# Patient Record
Sex: FEMALE | Race: WHITE | Employment: UNEMPLOYED | URBAN - METROPOLITAN AREA
[De-identification: names, ages, dates, MRNs, and addresses within clinical notes are randomized per-mention and may not be internally consistent; named-entity substitution may affect disease eponyms.]

---

## 2023-03-06 ENCOUNTER — APPOINTMENT (OUTPATIENT)
Dept: GENERAL RADIOLOGY | Age: 43
End: 2023-03-06
Payer: COMMERCIAL

## 2023-03-06 ENCOUNTER — HOSPITAL ENCOUNTER (EMERGENCY)
Age: 43
Discharge: HOME | End: 2023-03-06
Attending: EMERGENCY MEDICINE
Payer: COMMERCIAL

## 2023-03-06 VITALS
SYSTOLIC BLOOD PRESSURE: 129 MMHG | OXYGEN SATURATION: 95 % | HEART RATE: 76 BPM | TEMPERATURE: 98 F | BODY MASS INDEX: 33.29 KG/M2 | WEIGHT: 195 LBS | RESPIRATION RATE: 15 BRPM | DIASTOLIC BLOOD PRESSURE: 84 MMHG | HEIGHT: 64 IN

## 2023-03-06 DIAGNOSIS — R07.81 RIB PAIN ON RIGHT SIDE: Primary | ICD-10-CM

## 2023-03-06 PROCEDURE — 6370000000 HC RX 637 (ALT 250 FOR IP): Performed by: EMERGENCY MEDICINE

## 2023-03-06 PROCEDURE — 71046 X-RAY EXAM CHEST 2 VIEWS: CPT

## 2023-03-06 PROCEDURE — 99283 EMERGENCY DEPT VISIT LOW MDM: CPT

## 2023-03-06 RX ORDER — BENZONATATE 200 MG/1
200 CAPSULE ORAL 3 TIMES DAILY PRN
Qty: 21 CAPSULE | Refills: 0 | Status: SHIPPED | OUTPATIENT
Start: 2023-03-06 | End: 2023-03-13

## 2023-03-06 RX ORDER — HYDROCODONE BITARTRATE AND HOMATROPINE METHYLBROMIDE ORAL SOLUTION 5; 1.5 MG/5ML; MG/5ML
5 LIQUID ORAL EVERY 6 HOURS PRN
Qty: 120 ML | Refills: 0 | Status: SHIPPED | OUTPATIENT
Start: 2023-03-06 | End: 2023-03-13

## 2023-03-06 RX ORDER — LIDOCAINE 4 G/G
1 PATCH TOPICAL ONCE
Status: DISCONTINUED | OUTPATIENT
Start: 2023-03-06 | End: 2023-03-06 | Stop reason: HOSPADM

## 2023-03-06 RX ORDER — HYDROCODONE BITARTRATE AND ACETAMINOPHEN 5; 325 MG/1; MG/1
1 TABLET ORAL
Status: COMPLETED | OUTPATIENT
Start: 2023-03-06 | End: 2023-03-06

## 2023-03-06 RX ORDER — LIDOCAINE 50 MG/G
1 PATCH TOPICAL DAILY
Qty: 30 PATCH | Refills: 0 | Status: SHIPPED | OUTPATIENT
Start: 2023-03-06 | End: 2023-04-05

## 2023-03-06 RX ORDER — IBUPROFEN 600 MG/1
600 TABLET ORAL EVERY 6 HOURS PRN
Qty: 20 TABLET | Refills: 0 | Status: SHIPPED | OUTPATIENT
Start: 2023-03-06 | End: 2023-04-05

## 2023-03-06 RX ADMIN — HYDROCODONE BITARTRATE AND ACETAMINOPHEN 1 TABLET: 5; 325 TABLET ORAL at 06:46

## 2023-03-06 ASSESSMENT — PAIN SCALES - GENERAL: PAINLEVEL_OUTOF10: 8

## 2023-03-06 NOTE — Clinical Note
Yuli White was seen and treated in our emergency department on 3/6/2023. She may return to work on 03/08/2023. If you have any questions or concerns, please don't hesitate to call.       Coni Beauchamp MD

## 2023-03-06 NOTE — ED PROVIDER NOTES
Triage Chief Complaint:    Rib Pain (R sided. Reports feeling pain after coughing. Reports pain continues and is painful when pressure applied and hurts to breathe or cough/)    SEMAJ Mcconnell is a 37 y.o. female that presents for evaluation of 4 days of cough. She states is mostly nonproductive. She started having some right-sided rib pain which she attributes to the cough. She feels as though the cough has made it worse. Palpation over the right lower ribs also makes it worse. Denies any abdominal pain with this. No nausea or vomiting. No flank pain. Has not noticed any urinary or vaginal symptoms. No change to bowel movements. She has not tried nothing to help alleviate her symptoms. In no other exacerbating symptoms besides palpation and cough. Not on any birth control. No hemoptysis. No long periods of immobilization. No lower extremity swelling. No history of DVT or PE. No history of cancer or hypercoagulable state. History from : Patient    Limitations to history : None    ROS:  10 systems reviewed and negative except as above. Past Medical History:   Diagnosis Date    Hepatitis C      Past Surgical History:   Procedure Laterality Date    ANKLE SURGERY Left     RHINOPLASTY      TUBAL LIGATION       History reviewed. No pertinent family history.   Social History     Socioeconomic History    Marital status: Not on file     Spouse name: Not on file    Number of children: Not on file    Years of education: Not on file    Highest education level: Not on file   Occupational History    Not on file   Tobacco Use    Smoking status: Every Day     Packs/day: 0.25     Types: Cigarettes    Smokeless tobacco: Not on file   Substance and Sexual Activity    Alcohol use: Yes     Comment: 2x week    Drug use: Not Currently    Sexual activity: Not on file   Other Topics Concern    Not on file   Social History Narrative    Not on file     Social Determinants of Health     Financial Resource Strain: Not on file   Food Insecurity: Not on file   Transportation Needs: Not on file   Physical Activity: Not on file   Stress: Not on file   Social Connections: Not on file   Intimate Partner Violence: Not on file   Housing Stability: Not on file     Current Facility-Administered Medications   Medication Dose Route Frequency Provider Last Rate Last Admin    lidocaine 4 % external patch 1 patch  1 patch TransDERmal Once Moses Freed MD   1 patch at 03/06/23 0646     Current Outpatient Medications   Medication Sig Dispense Refill    ibuprofen (IBU) 600 MG tablet Take 1 tablet by mouth every 6 hours as needed for Pain 20 tablet 0    lidocaine (LIDODERM) 5 % Place 1 patch onto the skin daily 12 hours on, 12 hours off. Ok to substitute for 4% if not covered by insurance 30 patch 0    HYDROcodone homatropine (HYCODAN) 5-1.5 MG/5ML solution Take 5 mLs by mouth every 6 hours as needed (cough) for up to 7 days. Max Daily Amount: 20 mLs 120 mL 0    benzonatate (TESSALON) 200 MG capsule Take 1 capsule by mouth 3 times daily as needed for Cough 21 capsule 0     No Known Allergies    Nursing Notes Reviewed    Physical Exam:     ED Triage Vitals   Enc Vitals Group      BP 03/06/23 0602 (!) 146/102      Heart Rate 03/06/23 0607 80      Resp 03/06/23 0607 16      Temp 03/06/23 0612 98 °F (36.7 °C)      Temp Source 03/06/23 0612 Oral      SpO2 03/06/23 0602 96 %      Weight 03/06/23 0607 195 lb (88.5 kg)      Height 03/06/23 0607 5' 4\" (1.626 m)      Head Circumference --       Peak Flow --       Pain Score --       Pain Loc --       Pain Edu? --       Excl. in [address]? --        My pulse ox interpretation is - normal    General appearance:  No acute distress. Skin:  Warm. Dry. Eye:  Extraocular movements intact. Ears, nose, mouth and throat:  Oral mucosa moist, boggy turbinates bilaterally. Neck:  Trachea midline. No significant cervical adenopathy noted. Extremity:  No swelling. Normal ROM     Heart:  Regular rate and rhythm. Perfusion:  intact  Respiratory:  Lungs clear to auscultation bilaterally. Respirations nonlabored. Chest wall: Tenderness palpation over the right lower ribs which reproduces her complaint but no crepitus step-offs or deformities. No overlying skin changes. Abdominal:  Normal bowel sounds. Soft. Nontender. Non distended. Back:  No CVA tenderness to palpation     Neurological:  Alert and oriented times 3. Motor and sensory grossly intact, coordination intact          Psychiatric:  Appropriate      I have reviewed and interpreted all of the currently available lab results from this visit (if applicable):  No results found for this visit on 03/06/23. Radiographs (if obtained):  [] The following radiograph was interpreted by myself in the absence of a radiologist:   [x] Radiologist's Report Reviewed:  XR CHEST (2 VW)   Final Result   No acute findings in the chest.             Procedures:  None      Chart review shows recent radiographs:  No results found. MDM:     Discussion with Other Professionals : None    Social Determinants: None    Records Reviewed : None    Chronic conditions affecting care:  Smoking history    Imaging interpreted and reviewed by myself: CXR showed no evidence of pneumonia, new effusion, or pneumothorax. Patient was given the following medications:  Medications   lidocaine 4 % external patch 1 patch (1 patch TransDERmal Patch Applied 3/6/23 0646)   HYDROcodone-acetaminophen (NORCO) 5-325 MG per tablet 1 tablet (1 tablet Oral Given 3/6/23 0646)       Disposition Discussion: This patient appears to have more musculoskeletal etiology with her symptoms. Patient has reassuring vitals here. Treated symptomatically as chest x-ray does not show any acute irregularities. The patient had improvement after medications ministered here. No GI symptoms to suggest need for hepatobiliary etiology or work-up. I have low clinical suspicion for cholecystitis or cholelithiasis.   Patient does not have any abdominal tenderness to suggest bowel obstruction. Low risk by Wells criteria and PERC negative. Low clinical suspicion for PE. She knows she needs to follow-up closely with primary care doctor within the next 2-3 days. .    Disposition: Discharged     I am the primary physician of record    Clinical Impression:  1. Rib pain on right side      Disposition referral (if applicable):  Jihan Bhakta MD  [facility] [phone]    Schedule an appointment as soon as possible for a visit       [facility] Emergency Department  [facility] [phone]    If symptoms worsen  Disposition medications (if applicable):  New Prescriptions    BENZONATATE (TESSALON) 200 MG CAPSULE    Take 1 capsule by mouth 3 times daily as needed for Cough    HYDROCODONE HOMATROPINE (HYCODAN) 5-1.5 MG/5ML SOLUTION    Take 5 mLs by mouth every 6 hours as needed (cough) for up to 7 days. Max Daily Amount: 20 mLs    IBUPROFEN (IBU) 600 MG TABLET    Take 1 tablet by mouth every 6 hours as needed for Pain    LIDOCAINE (LIDODERM) 5 %    Place 1 patch onto the skin daily 12 hours on, 12 hours off. Ok to substitute for 4% if not covered by insurance       Comment: Please note this report has been produced using speech recognition software and may contain errors related to that system including errors in grammar, punctuation, and spelling, as well as words and phrases that may be inappropriate. If there are any questions or concerns please feel free to contact the dictating provider for clarification.        Abimbola Morillo MD  03/06/23 8666

## 2023-03-06 NOTE — ED NOTES
Discharge instructions reviewed with pt. Pt given paper prescriptions and instructed to take to whatever pharmacy of her choice. All questions answered at this time. Pt verbalized understanding.       Valerie Meneses, [facility]  03/06/23 3665

## 2023-09-25 ENCOUNTER — OFFICE (OUTPATIENT)
Dept: URBAN - METROPOLITAN AREA CLINIC 16 | Facility: CLINIC | Age: 43
End: 2023-09-25
Payer: COMMERCIAL

## 2023-09-25 VITALS
HEIGHT: 64 IN | DIASTOLIC BLOOD PRESSURE: 84 MMHG | OXYGEN SATURATION: 98 % | SYSTOLIC BLOOD PRESSURE: 128 MMHG | HEART RATE: 123 BPM | WEIGHT: 203 LBS

## 2023-09-25 DIAGNOSIS — K21.9 GASTRO-ESOPHAGEAL REFLUX DISEASE WITHOUT ESOPHAGITIS: ICD-10-CM

## 2023-09-25 DIAGNOSIS — F10.10 ALCOHOL ABUSE, UNCOMPLICATED: ICD-10-CM

## 2023-09-25 DIAGNOSIS — K44.9 DIAPHRAGMATIC HERNIA WITHOUT OBSTRUCTION OR GANGRENE: ICD-10-CM

## 2023-09-25 PROCEDURE — 99204 OFFICE O/P NEW MOD 45 MIN: CPT | Performed by: INTERNAL MEDICINE

## 2023-09-25 NOTE — SERVICEHPINOTES
Myah Garcia   is a   43   year old   female   patient who is seen   at the request of   Zion Hairston   for a consultation/initial visit.She is now 43 years old and has a history of reflux and hiatal hernia and symptoms that have been profound. She had been incarcerated for 5 months after DUI and her symptoms worsened over time she was even given medications with famotidine without complete improvement. She is due for screening nonetheless and does have a history of hepatitis C we will follow-up on labs to check for hepatitis and we will proceed with both colonoscopy and EGD for investigation

## 2023-09-25 NOTE — SERVICENOTES
She will have follow-up testing for hepatitis C and EGD and colonoscopy for evaluation of reflux and hiatal hernia with incomplete relief with medication with Pepcid.  She will have screening as well at the same time.  She has cut down on alcohol she did not drink for many years socially otherwise

## 2023-09-27 LAB
HCV VIRAL RNA,PCR W/REFLEX TO GENOTYPE: HCV RNA, PCR QN LOG: <1.18 LOG IU/ML
HCV VIRAL RNA,PCR W/REFLEX TO GENOTYPE: HCV RNA, PCR, QUAL: NOT DETECTED
HCV VIRAL RNA,PCR W/REFLEX TO GENOTYPE: HCV VIRAL RNA, PCR W/REFLEX: <15 IU/ML
HEPATIC FUNCTION PANEL: A/G RATIO: 1.3 RATIO
HEPATIC FUNCTION PANEL: ALBUMIN: 4.4 G/DL
HEPATIC FUNCTION PANEL: ALK PHOSPHATASE: 130 U/L
HEPATIC FUNCTION PANEL: ALT: 25 U/L
HEPATIC FUNCTION PANEL: AST: 39 U/L
HEPATIC FUNCTION PANEL: BILIRUBIN, INDIRECT: (no result) MG/DL
HEPATIC FUNCTION PANEL: BILIRUBIN,DIRECT: <0.2 MG/DL
HEPATIC FUNCTION PANEL: BILIRUBIN,TOTAL: 0.3 MG/DL
HEPATIC FUNCTION PANEL: GLOBULIN: 3.3 G/DL
HEPATIC FUNCTION PANEL: TOTAL PROTEIN: 7.7 G/DL

## 2023-10-05 ENCOUNTER — OFFICE (OUTPATIENT)
Dept: URBAN - METROPOLITAN AREA PATHOLOGY 1 | Facility: PATHOLOGY | Age: 43
End: 2023-10-05
Payer: COMMERCIAL

## 2023-10-05 ENCOUNTER — AMBULATORY SURGICAL CENTER (OUTPATIENT)
Dept: URBAN - METROPOLITAN AREA SURGERY 5 | Facility: SURGERY | Age: 43
End: 2023-10-05
Payer: COMMERCIAL

## 2023-10-05 VITALS
OXYGEN SATURATION: 98 % | SYSTOLIC BLOOD PRESSURE: 151 MMHG | RESPIRATION RATE: 18 BRPM | DIASTOLIC BLOOD PRESSURE: 107 MMHG | HEART RATE: 100 BPM | DIASTOLIC BLOOD PRESSURE: 103 MMHG | DIASTOLIC BLOOD PRESSURE: 107 MMHG | OXYGEN SATURATION: 92 % | RESPIRATION RATE: 18 BRPM | HEART RATE: 99 BPM | DIASTOLIC BLOOD PRESSURE: 86 MMHG | DIASTOLIC BLOOD PRESSURE: 101 MMHG | SYSTOLIC BLOOD PRESSURE: 130 MMHG | DIASTOLIC BLOOD PRESSURE: 96 MMHG | SYSTOLIC BLOOD PRESSURE: 163 MMHG | OXYGEN SATURATION: 92 % | DIASTOLIC BLOOD PRESSURE: 100 MMHG | OXYGEN SATURATION: 95 % | DIASTOLIC BLOOD PRESSURE: 85 MMHG | RESPIRATION RATE: 17 BRPM | RESPIRATION RATE: 16 BRPM | WEIGHT: 189 LBS | HEIGHT: 64 IN | DIASTOLIC BLOOD PRESSURE: 91 MMHG | SYSTOLIC BLOOD PRESSURE: 136 MMHG | SYSTOLIC BLOOD PRESSURE: 141 MMHG | SYSTOLIC BLOOD PRESSURE: 151 MMHG | RESPIRATION RATE: 22 BRPM | DIASTOLIC BLOOD PRESSURE: 96 MMHG | RESPIRATION RATE: 12 BRPM | DIASTOLIC BLOOD PRESSURE: 91 MMHG | SYSTOLIC BLOOD PRESSURE: 131 MMHG | OXYGEN SATURATION: 99 % | DIASTOLIC BLOOD PRESSURE: 86 MMHG | SYSTOLIC BLOOD PRESSURE: 142 MMHG | HEART RATE: 107 BPM | DIASTOLIC BLOOD PRESSURE: 103 MMHG | HEART RATE: 96 BPM | OXYGEN SATURATION: 96 % | SYSTOLIC BLOOD PRESSURE: 130 MMHG | WEIGHT: 189 LBS | RESPIRATION RATE: 22 BRPM | HEART RATE: 97 BPM | SYSTOLIC BLOOD PRESSURE: 127 MMHG | SYSTOLIC BLOOD PRESSURE: 127 MMHG | DIASTOLIC BLOOD PRESSURE: 101 MMHG | OXYGEN SATURATION: 96 % | SYSTOLIC BLOOD PRESSURE: 136 MMHG | OXYGEN SATURATION: 99 % | DIASTOLIC BLOOD PRESSURE: 102 MMHG | DIASTOLIC BLOOD PRESSURE: 102 MMHG | SYSTOLIC BLOOD PRESSURE: 131 MMHG | RESPIRATION RATE: 16 BRPM | RESPIRATION RATE: 17 BRPM | DIASTOLIC BLOOD PRESSURE: 85 MMHG | OXYGEN SATURATION: 94 % | HEART RATE: 101 BPM | SYSTOLIC BLOOD PRESSURE: 142 MMHG | OXYGEN SATURATION: 98 % | HEART RATE: 91 BPM | OXYGEN SATURATION: 94 % | TEMPERATURE: 98.1 F | HEART RATE: 97 BPM | HEART RATE: 99 BPM | HEART RATE: 98 BPM | TEMPERATURE: 98.1 F | RESPIRATION RATE: 19 BRPM | RESPIRATION RATE: 9 BRPM | RESPIRATION RATE: 12 BRPM | HEART RATE: 100 BPM | RESPIRATION RATE: 13 BRPM | RESPIRATION RATE: 23 BRPM | HEART RATE: 101 BPM | HEART RATE: 96 BPM | SYSTOLIC BLOOD PRESSURE: 163 MMHG | OXYGEN SATURATION: 95 % | RESPIRATION RATE: 23 BRPM | SYSTOLIC BLOOD PRESSURE: 143 MMHG | DIASTOLIC BLOOD PRESSURE: 100 MMHG | SYSTOLIC BLOOD PRESSURE: 141 MMHG | SYSTOLIC BLOOD PRESSURE: 143 MMHG | RESPIRATION RATE: 19 BRPM | RESPIRATION RATE: 13 BRPM | RESPIRATION RATE: 9 BRPM | HEART RATE: 107 BPM | HEART RATE: 98 BPM | HEIGHT: 64 IN | HEART RATE: 91 BPM

## 2023-10-05 DIAGNOSIS — K57.30 DIVERTICULOSIS OF LARGE INTESTINE WITHOUT PERFORATION OR ABS: ICD-10-CM

## 2023-10-05 DIAGNOSIS — K64.0 FIRST DEGREE HEMORRHOIDS: ICD-10-CM

## 2023-10-05 DIAGNOSIS — D12.2 BENIGN NEOPLASM OF ASCENDING COLON: ICD-10-CM

## 2023-10-05 DIAGNOSIS — K22.70 BARRETT'S ESOPHAGUS WITHOUT DYSPLASIA: ICD-10-CM

## 2023-10-05 DIAGNOSIS — Z12.11 ENCOUNTER FOR SCREENING FOR MALIGNANT NEOPLASM OF COLON: ICD-10-CM

## 2023-10-05 DIAGNOSIS — K21.9 GASTRO-ESOPHAGEAL REFLUX DISEASE WITHOUT ESOPHAGITIS: ICD-10-CM

## 2023-10-05 DIAGNOSIS — K22.89 OTHER SPECIFIED DISEASE OF ESOPHAGUS: ICD-10-CM

## 2023-10-05 DIAGNOSIS — K44.9 DIAPHRAGMATIC HERNIA WITHOUT OBSTRUCTION OR GANGRENE: ICD-10-CM

## 2023-10-05 DIAGNOSIS — K63.5 POLYP OF COLON: ICD-10-CM

## 2023-10-05 PROCEDURE — 45385 COLONOSCOPY W/LESION REMOVAL: CPT | Mod: 33 | Performed by: INTERNAL MEDICINE

## 2023-10-05 PROCEDURE — 88305 TISSUE EXAM BY PATHOLOGIST: CPT | Performed by: PATHOLOGY

## 2023-10-05 PROCEDURE — 43239 EGD BIOPSY SINGLE/MULTIPLE: CPT | Performed by: INTERNAL MEDICINE

## 2023-10-05 NOTE — SERVICEHPINOTES
She has GERD and hiatal hernia and for EGD and for colonoscopy for screeningFollow-up testing on hepatitis was negative

## 2023-10-05 NOTE — SERVICENOTES
She will have a EGD and colonoscopy in 3 years with fair prep and polyp also Ching's clinically

If Her biopsies are negative both exams can be in 5 years together

## 2023-10-10 LAB
PDF: PDF REPORT: (no result)
PDF: PDF REPORT: (no result)